# Patient Record
Sex: MALE | Race: BLACK OR AFRICAN AMERICAN | Employment: OTHER
[De-identification: names, ages, dates, MRNs, and addresses within clinical notes are randomized per-mention and may not be internally consistent; named-entity substitution may affect disease eponyms.]

---

## 2024-02-14 ENCOUNTER — HOSPITAL ENCOUNTER (OUTPATIENT)
Facility: HOSPITAL | Age: 29
Setting detail: RECURRING SERIES
End: 2024-02-14
Payer: OTHER GOVERNMENT

## 2024-02-20 ENCOUNTER — HOSPITAL ENCOUNTER (OUTPATIENT)
Facility: HOSPITAL | Age: 29
Setting detail: RECURRING SERIES
Discharge: HOME | End: 2024-02-23
Payer: OTHER GOVERNMENT

## 2024-02-20 PROCEDURE — 97162 PT EVAL MOD COMPLEX 30 MIN: CPT

## 2024-02-20 PROCEDURE — 97535 SELF CARE MNGMENT TRAINING: CPT

## 2024-02-20 PROCEDURE — 97110 THERAPEUTIC EXERCISES: CPT

## 2024-02-20 NOTE — PROGRESS NOTES
PT DAILY TREATMENT NOTE/Hip/Knee/Ankle EVAL 10-18    Patient Name: Elvie Yarbrough    Date: 2024    : 1995  Insurance: Payor: [employer] / Plan: [employer] / Product Type: *No Product type* /      Patient  verified yes     Visit #   Current / Total 1 16   Time   In / Out 2:30 3:14   Pain   In / Out 0 0   Subjective Functional Status/Changes: See below   Changes to:  Meds, Allergies, Med Hx, Sx Hx?  If yes, update Summary List yes       TREATMENT AREA =  Pain in right knee [M25.561]      SUBJECTIVE  Any medication changes, allergies to medications, adverse drug reactions, diagnosis change, or new procedure performed?: [x] No    [] Yes (see summary sheet for update)  Subjective functional status/changes:     Current symptoms/Complaints: right knee pain   Mechanism of Injury: Patient reports initial knee pain that went away after slipping and falling on flexed knee in 2023. Current pain started in 2023 after playing basketball. Pain has been on and off since then, increasing with activity. Does not recall specific injury. Swelled after leg day at gym about 3 weeks ago.     PLOF: functionally independent, active lifestyle playing basketball and weight-lifting, [employer]  Limitations to PLOF: has not been playing basketball or doing leg workouts in last 3 weeks; pain with 1 mile walk daily; pain with prolonged sitting- 15 minutes; keeps knee bent while sleeping due to pain with extension, pain with getting on floor to play with 2 year old son; difficulty with ambulating and stairs when swollen and acutely painful    Pain Level (0-10 scale): pain above and below knee cap; swelling above knee cap  [x]constant []intermittent []improving []worsening []no change since onset  Current: 1-2/10  Best: 0/10 during rarely when first laying down  Worst: 10/10 during work at gym- was unable to walk or bend/flex knee  Sleep: is not  interrupted secondary to pain    Previous Treatment/Compliance: ice,

## 2024-02-20 NOTE — PROGRESS NOTES
[facility]  [address] [place] [address]  Ph [phone]  Fx [phone]    Plan of Care/ Statement of Necessity for Physical Therapy Services      Patient name: Elvie Yarbrough Start of Care: 2024   Referral source: Lisa Moya : 1995    Medical Diagnosis: Pain in right knee [M25.561]   Onset Date:10/1/2023    Treatment Diagnosis: M25.561  RIGHT KNEE PAIN      Prior Hospitalization: see medical history Provider#: [id number]   Medications: Verified on Patient summary List   Comorbidities: None  Prior Level of Function:  functionally independent, active lifestyle playing basketball and weight-lifting, [employer]       The Plan of Care and following information is based on the information from the initial evaluation.  Assessment/ key information:  Patient is a 28 yo male who [facility] with c/o right knee pain. Patient's symptoms began in July with slip and fall on flexed knee, and has been mostly present since playing basketball in October. Swelling began 3 weeks ago after leg day at gym. Patient's impairments include pain, swelling, decreased knee ROM, decreased LE strength. Patient had special tests indicating possible meniscal tear or patellar chondral defect. Patient reports decreased PLOF including inability to workout due to pain and swelling, pain with prolonged walking, pain with sitting >15 minutes, and difficult playing with so on floor. Patient demonstrates decreased ROM, decreased strength, impaired posture, impaired gait mechancis, pain and decreased functional mobility tolerance.     Evaluation Complexity HistoryMEDIUM  Complexity : 1-2 comorbidities / personal factors will impact the outcome/ POC  ; Examination HIGH Complexity : 4+ Standardized tests and measures addressing body structure, function, activity limitation and / or participation in recreation  ;Presentation MEDIUM Complexity : Evolving with changing characteristics

## 2024-02-23 ENCOUNTER — HOSPITAL ENCOUNTER (OUTPATIENT)
Facility: HOSPITAL | Age: 29
Setting detail: RECURRING SERIES
Discharge: HOME | End: 2024-02-26
Payer: OTHER GOVERNMENT

## 2024-02-23 PROCEDURE — 97112 NEUROMUSCULAR REEDUCATION: CPT

## 2024-02-23 PROCEDURE — 97535 SELF CARE MNGMENT TRAINING: CPT

## 2024-02-23 PROCEDURE — 97530 THERAPEUTIC ACTIVITIES: CPT

## 2024-02-23 PROCEDURE — 97110 THERAPEUTIC EXERCISES: CPT

## 2024-02-23 NOTE — PROGRESS NOTES
PHYSICAL / OCCUPATIONAL THERAPY - DAILY TREATMENT NOTE     Patient Name: Elvie Yarbrough    Date: 2024    : 1995  Insurance: Payor: [employer] EAST / Plan: [employer] / Product Type: *No Product type* /      Patient  verified Yes     Visit #   Current / Total 2 16   Time   In / Out 10:32 11:26   Pain   In / Out 2 3   Subjective Functional Status/Changes: Patient has no new complaints.   Changes to:  Allergies, Med Hx, Sx Hx?   no       TREATMENT AREA =  Pain in right knee [M25.561]    OBJECTIVE    Therapeutic Procedures:  Tx Min Billable or 1:1 Min (if diff from Tx Min) Procedure, Rationale, Specifics   13  35834 Therapeutic Exercise (timed):  increase ROM, strength, coordination, balance, and proprioception to improve patient's ability to progress to PLOF and address remaining functional goals. (see flow sheet as applicable)    Details if applicable:       10  08524 Therapeutic Activity (timed):  use of dynamic activities replicating functional movements to increase ROM, strength, coordination, balance, and proprioception in order to improve patient's ability to progress to PLOF and address remaining functional goals.  (see flow sheet as applicable)    Details if applicable:     23  67294 Neuromuscular Re-Education (timed):  improve balance, coordination, kinesthetic sense, posture, core stability and proprioception to improve patient's ability to develop conscious control of individual muscles and awareness of position of extremities in order to progress to PLOF and address remaining functional goals. (see flow sheet as applicable)     Details if applicable:     8  39552 Self Care/Home Management (timed):  improve patient knowledge and understanding of pain reducing techniques, activity modification, diagnosis/prognosis, physical therapy expectations, procedures and progression, and muscle atrophy education  to improve patient's ability to progress to PLOF and address remaining functional goals.  (see

## 2024-02-28 ENCOUNTER — HOSPITAL ENCOUNTER (OUTPATIENT)
Facility: HOSPITAL | Age: 29
Setting detail: RECURRING SERIES
Discharge: HOME | End: 2024-03-02
Payer: OTHER GOVERNMENT

## 2024-02-28 PROCEDURE — 97112 NEUROMUSCULAR REEDUCATION: CPT

## 2024-02-28 PROCEDURE — 97530 THERAPEUTIC ACTIVITIES: CPT

## 2024-02-28 PROCEDURE — 97110 THERAPEUTIC EXERCISES: CPT

## 2024-02-28 NOTE — PROGRESS NOTES
[facility]   4/8/2024  9:50 AM Patience Gillespie, PT [facility]   4/10/2024  9:50 AM Patience Gillespie, PT [facility]

## 2024-03-01 ENCOUNTER — HOSPITAL ENCOUNTER (OUTPATIENT)
Facility: HOSPITAL | Age: 29
Setting detail: RECURRING SERIES
Discharge: HOME | End: 2024-03-04
Payer: OTHER GOVERNMENT

## 2024-03-01 PROCEDURE — 97112 NEUROMUSCULAR REEDUCATION: CPT

## 2024-03-01 PROCEDURE — 97110 THERAPEUTIC EXERCISES: CPT

## 2024-03-01 PROCEDURE — 97530 THERAPEUTIC ACTIVITIES: CPT

## 2024-03-01 NOTE — PROGRESS NOTES
PHYSICAL / OCCUPATIONAL THERAPY - DAILY TREATMENT NOTE     Patient Name: lEvie Yarbrough    Date: 3/1/2024    : 1995  Insurance: Payor:  EAST / Plan:  EAST / Product Type: *No Product type* /      Patient  verified Yes     Visit #   Current / Total 4 16   Time   In / Out 1225 105   Pain   In / Out 3/10 3/10   Subjective Functional Status/Changes: Pt reported that he is still having catching during transfers   Changes to:  Allergies, Med Hx, Sx Hx?   no       TREATMENT AREA =  Pain in right knee [M25.561]    OBJECTIVE    Therapeutic Procedures:  Tx Min Billable or 1:1 Min (if diff from Tx Min) Procedure, Rationale, Specifics   15 15 05754 Therapeutic Exercise (timed):  increase ROM, strength, coordination, balance, and proprioception to improve patient's ability to progress to PLOF and address remaining functional goals. (see flow sheet as applicable)    Details if applicable:       15 15 16794 Neuromuscular Re-Education (timed):  improve balance, coordination, kinesthetic sense, posture, core stability and proprioception to improve patient's ability to develop conscious control of individual muscles and awareness of position of extremities in order to progress to PLOF and address remaining functional goals. (see flow sheet as applicable)    Details if applicable:     10 10 35157 Therapeutic Activity (timed):  use of dynamic activities replicating functional movements to increase ROM, strength, coordination, balance, and proprioception in order to improve patient's ability to progress to PLOF and address remaining functional goals.  (see flow sheet as applicable)     Details if applicable:     40 40 [facility] Totals Reminder: bill using total billable min of TIMED therapeutic procedures (example: do not include dry needle or estim unattended, both untimed codes, in totals to left)  8-22 min = 1 unit; 23-37 min = 2 units; 38-52 min = 3 units; 53-67 min = 4 units; 68-82 min = 5 units   Total Total

## 2024-03-04 ENCOUNTER — HOSPITAL ENCOUNTER (OUTPATIENT)
Facility: HOSPITAL | Age: 29
Setting detail: RECURRING SERIES
Discharge: HOME | End: 2024-03-07
Payer: OTHER GOVERNMENT

## 2024-03-04 PROCEDURE — 97530 THERAPEUTIC ACTIVITIES: CPT

## 2024-03-04 PROCEDURE — 97112 NEUROMUSCULAR REEDUCATION: CPT

## 2024-03-04 PROCEDURE — 97110 THERAPEUTIC EXERCISES: CPT

## 2024-03-04 NOTE — PROGRESS NOTES
PHYSICAL / OCCUPATIONAL THERAPY - DAILY TREATMENT NOTE     Patient Name: Elvie Yarbrough    Date: 3/4/2024    : 1995  Insurance: Payor:  EAST / Plan:  EAST / Product Type: *No Product type* /      Patient  verified Yes     Visit #   Current / Total 5 16   Time   In / Out 12:30 1:11   Pain   In / Out 1 4   Subjective Functional Status/Changes: Patient reports decreased knee pain today   Changes to:  Allergies, Med Hx, Sx Hx?   no       TREATMENT AREA =  Pain in right knee [M25.561]    OBJECTIVE      Therapeutic Procedures:  Tx Min Billable or 1:1 Min (if diff from Tx Min) Procedure, Rationale, Specifics   15  59917 Therapeutic Exercise (timed):  increase ROM, strength, coordination, balance, and proprioception to improve patient's ability to progress to PLOF and address remaining functional goals. (see flow sheet as applicable)    Details if applicable:       15  54956 Neuromuscular Re-Education (timed):  improve balance, coordination, kinesthetic sense, posture, core stability and proprioception to improve patient's ability to develop conscious control of individual muscles and awareness of position of extremities in order to progress to PLOF and address remaining functional goals. (see flow sheet as applicable)    Details if applicable:     11  44609 Therapeutic Activity (timed):  use of dynamic activities replicating functional movements to increase ROM, strength, coordination, balance, and proprioception in order to improve patient's ability to progress to PLOF and address remaining functional goals.  (see flow sheet as applicable)     Details if applicable:           Details if applicable:            Details if applicable:     41  [facility] Totals Reminder: bill using total billable min of TIMED therapeutic procedures (example: do not include dry needle or estim unattended, both untimed codes, in totals to left)  8-22 min = 1 unit; 23-37 min = 2 units; 38-52 min = 3 units; 53-67 min = 4 units;

## 2024-03-05 ENCOUNTER — HOSPITAL ENCOUNTER (OUTPATIENT)
Facility: HOSPITAL | Age: 29
Setting detail: RECURRING SERIES
End: 2024-03-05
Payer: OTHER GOVERNMENT

## 2024-03-05 ENCOUNTER — TELEPHONE (OUTPATIENT)
Facility: HOSPITAL | Age: 29
End: 2024-03-05

## 2024-03-05 NOTE — PROGRESS NOTES
PHYSICAL / OCCUPATIONAL THERAPY - DAILY TREATMENT NOTE     Patient Name: Elvie Yarbrough    Date: 3/5/2024    : 1995  Insurance: Payor: [employer] / Plan: [employer] / Product Type: *No Product type* /      Patient  verified Yes     Visit #   Current / Total 6 16   Time   In / Out *** ***   Pain   In / Out *** ***   Subjective Functional Status/Changes: ***   Changes to:  Allergies, Med Hx, Sx Hx?   no       TREATMENT AREA =  Pain in right knee [M25.561]    OBJECTIVE    Modalities Rationale:     decrease edema, decrease inflammation, and decrease pain to improve patient's ability to progress to PLOF and address remaining functional goals.     min [] Estim Unattended, type/location:                                      []  w/ice    []  w/heat    min [] Estim Attended, type/location:                                     []  w/US     []  w/ice    []  w/heat    []  TENS insruct      min []  Mechanical Traction: type/lbs                   []  pro   []  sup   []  int   []  cont    []  before manual    []  after manual    min []  Ultrasound, settings/location:      min []  Iontophoresis w/ dexamethasone, location:                                               []  take home patch       []  in clinic        min  unbilled []  Ice     []  Heat    location/position:     min []  Paraffin,  details:     min []  Vasopneumatic Device, press/temp:     min []  Whirlpool / Fluido:    If using vaso (only need to measure limb vaso being performed on)      pre-treatment girth :       post-treatment girth :       measured at (landmark location) :      min []  Other:    Skin assessment post-treatment (if applicable):    [x]  intact    []  redness- no adverse reaction                 []redness - adverse reaction:         Therapeutic Procedures:  Tx Min Billable or 1:1 Min (if diff from Tx Min) Procedure, Rationale, Specifics   ***  65251 Therapeutic Exercise (timed):  increase ROM, strength, coordination, balance, and

## 2024-03-12 ENCOUNTER — HOSPITAL ENCOUNTER (OUTPATIENT)
Facility: HOSPITAL | Age: 29
Setting detail: RECURRING SERIES
Discharge: HOME | End: 2024-03-15
Payer: OTHER GOVERNMENT

## 2024-03-12 PROCEDURE — 97110 THERAPEUTIC EXERCISES: CPT

## 2024-03-12 PROCEDURE — 97535 SELF CARE MNGMENT TRAINING: CPT

## 2024-03-12 PROCEDURE — 97530 THERAPEUTIC ACTIVITIES: CPT

## 2024-03-12 PROCEDURE — 97112 NEUROMUSCULAR REEDUCATION: CPT

## 2024-03-12 NOTE — PROGRESS NOTES
to perform 10 reps of squats with at least 30# weight with good mechanics and no pain for ability to play and care for son.              Eval status: pain reported with squats   Current: used TRX to perform squats to work on proper posterior weight shifting to avoid bilateral knee pain    3/12/24, IN PROGRESS    PLAN  Yes  Continue plan of care  []  Upgrade activities as tolerated  []  Discharge due to :  []  Other:    Patience Gillespie, LENNY    3/12/2024    9:41 AM    Future Appointments   Date Time Provider Department Center   3/15/2024  1:10 PM Antonella Carrillo, PT [facility]   3/18/2024  9:10 AM [facility]   3/20/2024  9:50 AM Patience [facility]illespie, [facility]   3/25/2024  9:50 AM Patience Gillespie, [facility]   3/27/2024  9:50 AM Camila Viera, PT [facility]   4/1/2024  9:50 AM Patience Gillespie, PT [facility]   4/3/2024  9:10 AM Patience Gillespie, PT [facility]   4/8/2024  9:50 AM Patience Gillespie, [facility]   4/10/2024  9:50 AM [facility]illespie[facility]

## 2024-03-15 ENCOUNTER — HOSPITAL ENCOUNTER (OUTPATIENT)
Facility: HOSPITAL | Age: 29
Setting detail: RECURRING SERIES
End: 2024-03-15
Payer: OTHER GOVERNMENT

## 2024-03-15 ENCOUNTER — TELEPHONE (OUTPATIENT)
Facility: HOSPITAL | Age: 29
End: 2024-03-15

## 2024-03-18 ENCOUNTER — HOSPITAL ENCOUNTER (OUTPATIENT)
Facility: HOSPITAL | Age: 29
Setting detail: RECURRING SERIES
Discharge: HOME | End: 2024-03-21
Payer: OTHER GOVERNMENT

## 2024-03-18 PROCEDURE — 97112 NEUROMUSCULAR REEDUCATION: CPT

## 2024-03-18 PROCEDURE — 97110 THERAPEUTIC EXERCISES: CPT

## 2024-03-18 PROCEDURE — 97530 THERAPEUTIC ACTIVITIES: CPT

## 2024-03-18 NOTE — PROGRESS NOTES
PHYSICAL / OCCUPATIONAL THERAPY - DAILY TREATMENT NOTE     Patient Name: Elvie Yarbrough    Date: 3/18/2024    : 1995  Insurance: Payor:  EAST / Plan: [facility] / Product Type: *No Product type* /      Patient  verified Yes     Visit #   Current / Total 7 16   Time   In / Out 1:56 2:40   Pain   In / Out 4 3   Subjective Functional Status/Changes: Patient reports no new symptoms.   Changes to:  Allergies, Med Hx, Sx Hx?   no       TREATMENT AREA =  Pain in right knee [M25.561]    OBJECTIVE      Therapeutic Procedures:  Tx Min Billable or 1:1 Min (if diff from Tx Min) Procedure, Rationale, Specifics   8  43335 Therapeutic Exercise (timed):  increase ROM, strength, coordination, balance, and proprioception to improve patient's ability to progress to PLOF and address remaining functional goals. (see flow sheet as applicable)    Details if applicable:       12  04778 Therapeutic Activity (timed):  use of dynamic activities replicating functional movements to increase ROM, strength, coordination, balance, and proprioception in order to improve patient's ability to progress to PLOF and address remaining functional goals.  (see flow sheet as applicable)    Details if applicable:     24  91101 Neuromuscular Re-Education (timed):  improve balance, coordination, kinesthetic sense, posture, core stability and proprioception to improve patient's ability to develop conscious control of individual muscles and awareness of position of extremities in order to progress to PLOF and address remaining functional goals. (see flow sheet as applicable)     Details if applicable:     44  [facility] Totals Reminder: bill using total billable min of TIMED therapeutic procedures (example: do not include dry needle or estim unattended, both untimed codes, in totals to left)  8-22 min = 1 unit; 23-37 min = 2 units; 38-52 min = 3 units; 53-67 min = 4 units; 68-82 min = 5 units   Total Total     TOTAL TREATMENT TIME:        44     [x]

## 2024-03-20 ENCOUNTER — HOSPITAL ENCOUNTER (OUTPATIENT)
Facility: HOSPITAL | Age: 29
Setting detail: RECURRING SERIES
Discharge: HOME | End: 2024-03-23
Payer: OTHER GOVERNMENT

## 2024-03-20 PROCEDURE — 97535 SELF CARE MNGMENT TRAINING: CPT

## 2024-03-20 PROCEDURE — 97112 NEUROMUSCULAR REEDUCATION: CPT

## 2024-03-20 PROCEDURE — 97110 THERAPEUTIC EXERCISES: CPT

## 2024-03-20 PROCEDURE — 97530 THERAPEUTIC ACTIVITIES: CPT

## 2024-03-20 NOTE — PROGRESS NOTES
PHYSICAL / OCCUPATIONAL THERAPY - DAILY TREATMENT NOTE     Patient Name: Elvie Yarbrough    Date: 3/20/2024    : 1995  Insurance: Payor:  EAST / Plan:  EAST / Product Type: *No Product type* /      Patient  verified Yes     Visit #   Current / Total 8 16   Time   In / Out 0950 1035   Pain   In / Out 4 3   Subjective Functional Status/Changes: Pt pleasant, reports he has been going to the gym but has not been doing any leg exercises or strenghening bc he was told not to. This writer encouraged him to gradually return to incorporating LE exercises back into workout routine with low weight, high repetitions.   Changes to:  Allergies, Med Hx, Sx Hx?   no       TREATMENT AREA =  Pain in right knee [M25.561]    OBJECTIVE    Therapeutic Procedures:  Tx Min Billable or 1:1 Min (if diff from Tx Min) Procedure, Rationale, Specifics   12  65976 Therapeutic Exercise (timed):  increase ROM, strength, coordination, balance, and proprioception to improve patient's ability to progress to PLOF and address remaining functional goals. (see flow sheet as applicable)    Details if applicable:       13  60906 Neuromuscular Re-Education (timed):  improve balance, coordination, kinesthetic sense, posture, core stability and proprioception to improve patient's ability to develop conscious control of individual muscles and awareness of position of extremities in order to progress to PLOF and address remaining functional goals. (see flow sheet as applicable)    Details if applicable:     10  28788 Therapeutic Activity (timed):  use of dynamic activities replicating functional movements to increase ROM, strength, coordination, balance, and proprioception in order to improve patient's ability to progress to PLOF and address remaining functional goals.  (see flow sheet as applicable)     Details if applicable:     10  93084 Self Care/Home Management (timed):  improve patient knowledge and understanding of positioning,  Pt received the Stamaril yellow fever vaccination. Pt tolerated the injection well. Yellow travel card provided. Pt left the unit in NAD.

## 2024-03-25 ENCOUNTER — HOSPITAL ENCOUNTER (OUTPATIENT)
Facility: HOSPITAL | Age: 29
Setting detail: RECURRING SERIES
Discharge: HOME | End: 2024-03-28
Payer: OTHER GOVERNMENT

## 2024-03-25 PROCEDURE — 97530 THERAPEUTIC ACTIVITIES: CPT

## 2024-03-25 PROCEDURE — 97112 NEUROMUSCULAR REEDUCATION: CPT

## 2024-03-25 PROCEDURE — 97110 THERAPEUTIC EXERCISES: CPT

## 2024-03-25 NOTE — PROGRESS NOTES
PHYSICAL / OCCUPATIONAL THERAPY - DAILY TREATMENT NOTE     Patient Name: Elvie Yarbrough    Date: 3/25/2024    : 1995  Insurance: Payor:  EAST / Plan:  EAST / Product Type: *No Product type* /      Patient  verified Yes     Visit #   Current / Total 9 16   Time   In / Out 0956 1035   Pain   In / Out 3 3   Subjective Functional Status/Changes: Pt pleasant, reports he went to the gym and did some leg exercises without weight. He reports no increased pain.   Changes to:  Allergies, Med Hx, Sx Hx?   no       TREATMENT AREA =  Pain in right knee [M25.561]    OBJECTIVE      Therapeutic Procedures:  Tx Min Billable or 1:1 Min (if diff from Tx Min) Procedure, Rationale, Specifics   9  70036 Therapeutic Exercise (timed):  increase ROM, strength, coordination, balance, and proprioception to improve patient's ability to progress to PLOF and address remaining functional goals. (see flow sheet as applicable)    Details if applicable:       20  22479 Neuromuscular Re-Education (timed):  improve balance, coordination, kinesthetic sense, posture, core stability and proprioception to improve patient's ability to develop conscious control of individual muscles and awareness of position of extremities in order to progress to PLOF and address remaining functional goals. (see flow sheet as applicable)    Details if applicable:     10  90029 Therapeutic Activity (timed):  use of dynamic activities replicating functional movements to increase ROM, strength, coordination, balance, and proprioception in order to improve patient's ability to progress to PLOF and address remaining functional goals.  (see flow sheet as applicable)     Details if applicable:           Details if applicable:            Details if applicable:     39  [facility] Totals Reminder: bill using total billable min of TIMED therapeutic procedures (example: do not include dry needle or estim unattended, both untimed codes, in totals to left)  8-22 min = 1

## 2024-03-25 NOTE — PROGRESS NOTES
[facility]  [address] [place] [address]  Ph [phone]  Fx [phone]    Physical Therapy Progress Note  Patient name: Elvie Yarbrough Start of Care: 2024   Referral source: Lisa Moya : 1995               Medical Diagnosis: Pain in right knee [M25.561]    Onset Date:10/1/2023               Treatment Diagnosis: M25.561  RIGHT KNEE PAIN      Prior Hospitalization: see medical history Provider#: [id number]   Medications: Verified on Patient summary List   Comorbidities: None  Prior Level of Function:  functionally independent, active lifestyle playing basketball and weight-lifting, [employer]     Visits from Start of Care: 9    Missed Visits: 3    Updated Goals/Measure of Progress: To be achieved in 6 treatments:    Short Term Goals: To be accomplished in 8 treatments}:  Patient will be independent and compliant with HEP to progress toward goals and restore functional mobility.   Eval Status: issued at eval  Current: Patient reports compliance with his HEP.    3/12/24, MET     Patient will report ability to sit for 60 minutes without knee pain.   Eval Status: knee pain after sitting 15 minutes.  3/1/24: able to sit for apx 20-25 min with out pain   Current: 4-5/10 pain when sitting for 30 minutes, patient hasn't yet tried sitting for an hour   3/12/24, MET     3.   Pt will demonstrate normalized, symmetrical gait for ability to ambulate 1 mile without knee pain.  Eval Status: Gait: favors left LE, decreased stance time on right and decreased weight-shift right  3/25/24 PN: normal gait mechanics; pt reports he walked for 40 minutes the other with with 4/10 pain at worst during walk progressing     Pt will have painfree right knee AROM 0 to 132 to aid in functional mechanics for ambulation/ADLs.  Eval Status: AROM 2 to 125 limited by pain  Current: AROM: 0-135 degs   3/12/24, MET     Long Term Goals: To be accomplished in 16 treatments:  Patient will improve FOTO

## 2024-03-27 ENCOUNTER — HOSPITAL ENCOUNTER (OUTPATIENT)
Facility: HOSPITAL | Age: 29
Setting detail: RECURRING SERIES
Discharge: HOME | End: 2024-03-30
Payer: OTHER GOVERNMENT

## 2024-03-27 PROCEDURE — 97530 THERAPEUTIC ACTIVITIES: CPT

## 2024-03-27 PROCEDURE — 97110 THERAPEUTIC EXERCISES: CPT

## 2024-03-27 PROCEDURE — 97112 NEUROMUSCULAR REEDUCATION: CPT

## 2024-03-27 NOTE — PROGRESS NOTES
PHYSICAL / OCCUPATIONAL THERAPY - DAILY TREATMENT NOTE     Patient Name: Elvie Yarbrough    Date: 3/27/2024    : 1995  Insurance: Payor:  EAST / Plan:  EAST / Product Type: *No Product type* /      Patient  verified Yes     Visit #   Current / Total 10 16   Time   In / Out 955 1029   Pain   In / Out 3/10 5/10   Subjective Functional Status/Changes: I feel like I am aging too fast; I'm the youngest one here    Changes to:  Allergies, Med Hx, Sx Hx?   no       TREATMENT AREA =  Pain in right knee [M25.561]    OBJECTIVE    Therapeutic Procedures:  Tx Min Billable or 1:1 Min (if diff from Tx Min) Procedure, Rationale, Specifics   10 10 59395 Therapeutic Exercise (timed):  increase ROM, strength, coordination, balance, and proprioception to improve patient's ability to progress to PLOF and address remaining functional goals. (see flow sheet as applicable)    Details if applicable:       10 10 35880 Neuromuscular Re-Education (timed):  improve balance, coordination, kinesthetic sense, posture, core stability and proprioception to improve patient's ability to develop conscious control of individual muscles and awareness of position of extremities in order to progress to PLOF and address remaining functional goals. (see flow sheet as applicable)    Details if applicable:      13767 Therapeutic Activity (timed):  use of dynamic activities replicating functional movements to increase ROM, strength, coordination, balance, and proprioception in order to improve patient's ability to progress to PLOF and address remaining functional goals.  (see flow sheet as applicable)    Details if applicable:     34 34 [facility] Totals Reminder: bill using total billable min of TIMED therapeutic procedures (example: do not include dry needle or estim unattended, both untimed codes, in totals to left)  8-22 min = 1 unit; 23-37 min = 2 units; 38-52 min = 3 units; 53-67 min = 4 units; 68-82 min = 5 units   Total Total

## 2024-04-01 ENCOUNTER — HOSPITAL ENCOUNTER (OUTPATIENT)
Facility: HOSPITAL | Age: 29
Setting detail: RECURRING SERIES
End: 2024-04-01
Payer: OTHER GOVERNMENT

## 2024-04-03 ENCOUNTER — APPOINTMENT (OUTPATIENT)
Facility: HOSPITAL | Age: 29
End: 2024-04-03
Payer: OTHER GOVERNMENT

## 2024-04-08 ENCOUNTER — HOSPITAL ENCOUNTER (OUTPATIENT)
Facility: HOSPITAL | Age: 29
Setting detail: RECURRING SERIES
End: 2024-04-08
Payer: OTHER GOVERNMENT

## 2024-04-08 ENCOUNTER — TELEPHONE (OUTPATIENT)
Facility: HOSPITAL | Age: 29
End: 2024-04-08

## 2024-04-09 ENCOUNTER — HOSPITAL ENCOUNTER (OUTPATIENT)
Facility: HOSPITAL | Age: 29
Setting detail: RECURRING SERIES
Discharge: HOME | End: 2024-04-12
Payer: OTHER GOVERNMENT

## 2024-04-09 PROCEDURE — 97110 THERAPEUTIC EXERCISES: CPT

## 2024-04-09 PROCEDURE — 97530 THERAPEUTIC ACTIVITIES: CPT

## 2024-04-09 PROCEDURE — 97112 NEUROMUSCULAR REEDUCATION: CPT

## 2024-04-09 NOTE — PROGRESS NOTES
PHYSICAL / OCCUPATIONAL THERAPY - DAILY TREATMENT NOTE     Patient Name: Elvie Yarbrough    Date: 2024    : 1995  Insurance: Payor:  EAST / Plan: [email or web address] EAST / Product Type: *No Product type* /      Patient  verified Yes     Visit #   Current / Total 11 16   Time   In / Out 2:56 PM 3:34 PM   Pain   In / Out 1 2   Subjective Functional Status/Changes: Patient reports he is doing well today. No new changes to report at this time.    Changes to:  Allergies, Med Hx, Sx Hx?   no       TREATMENT AREA =  Pain in right knee [M25.561]    OBJECTIVE      Therapeutic Procedures:  Tx Min Billable or 1:1 Min (if diff from Tx Min) Procedure, Rationale, Specifics   16  22101 Therapeutic Exercise (timed):  increase ROM, strength, coordination, balance, and proprioception to improve patient's ability to progress to PLOF and address remaining functional goals. (see flow sheet as applicable)    Details if applicable:       12  61381 Therapeutic Activity (timed):  use of dynamic activities replicating functional movements to increase ROM, strength, coordination, balance, and proprioception in order to improve patient's ability to progress to PLOF and address remaining functional goals.  (see flow sheet as applicable)    Details if applicable:     10  67332 Neuromuscular Re-Education (timed):  improve balance, coordination, kinesthetic sense, posture, core stability and proprioception to improve patient's ability to develop conscious control of individual muscles and awareness of position of extremities in order to progress to PLOF and address remaining functional goals. (see flow sheet as applicable)     Details if applicable:           Details if applicable:            Details if applicable:     38  [facility] Totals Reminder: bill using total billable min of TIMED therapeutic procedures (example: do not include dry needle or estim unattended, both untimed codes, in totals to left)  8-22 min = 1 unit; 23-37 min = 2 units;

## 2024-04-10 ENCOUNTER — HOSPITAL ENCOUNTER (OUTPATIENT)
Facility: HOSPITAL | Age: 29
Setting detail: RECURRING SERIES
Discharge: HOME | End: 2024-04-13
Payer: OTHER GOVERNMENT

## 2024-04-10 PROCEDURE — 97112 NEUROMUSCULAR REEDUCATION: CPT

## 2024-04-10 PROCEDURE — 97530 THERAPEUTIC ACTIVITIES: CPT

## 2024-04-10 NOTE — PROGRESS NOTES
walk.  Current: 0-2/10 pain at worst in the last week    4/10/24, MET                 5. Patient will demonstrate ability to perform 10 reps of squats with at least 30# weight with good mechanics and no pain for ability to play and care for son.              Eval status: pain reported with squats              3/25/24 PN:  used TRX to perform squats to work on proper posterior weight shifting to avoid bilateral knee pain    IN PROGRESS   Current: Patient able to perform 2x10 reps of 15# Goblet squats with good form.   410/24, PROGRESSING     PLAN  Yes  Continue plan of care  []  Upgrade activities as tolerated  []  Discharge due to :  []  Other:    Patience Gillespie, PT    4/10/2024    9:54 AM    Future Appointments   Date Time Provider Department Center   4/15/2024  1:50 PM Antonella Carrillo, PT [facility]   4/19/2024  2:30 PM Trevon Howe, MT [facility]   4/26/2024  2:30 PM Camila Viera, PT [facility]

## 2024-04-15 ENCOUNTER — HOSPITAL ENCOUNTER (OUTPATIENT)
Facility: HOSPITAL | Age: 29
Setting detail: RECURRING SERIES
Discharge: HOME | End: 2024-04-18
Payer: OTHER GOVERNMENT

## 2024-04-15 PROCEDURE — 97530 THERAPEUTIC ACTIVITIES: CPT

## 2024-04-15 PROCEDURE — 97110 THERAPEUTIC EXERCISES: CPT

## 2024-04-15 PROCEDURE — 97112 NEUROMUSCULAR REEDUCATION: CPT

## 2024-04-15 NOTE — PROGRESS NOTES
38-52 min = 3 units; 53-67 min = 4 units; 68-82 min = 5 units   Total Total     TOTAL TREATMENT TIME:        37     [x]  Patient Education billed concurrently with other procedures   [x] Review HEP    [] Progressed/Changed HEP, detail:    [] Other detail:       Objective Information/Functional Measures/Assessment    Patient tolerated treatment session well today. Patient had no complaints with increased weight for goblet squats and single leg press to accomplish lower extremity strength and stability. Patient continues to make steady progress toward goals and would benefit from continued skilled PT intervention to address remaining deficits outlined in goals below.    Patient will continue to benefit from skilled PT / OT services to modify and progress therapeutic interventions, analyze and address functional mobility deficits, analyze and address ROM deficits, analyze and address strength deficits, analyze and address soft tissue restrictions, analyze and cue for proper movement patterns, and analyze and modify for postural abnormalities to address functional deficits and attain remaining goals.    Progress toward goals / Updated goals:  []  See Progress Note/Recertification    Short Term Goals: To be accomplished in 8 treatments}:  Patient will be independent and compliant with HEP to progress toward goals and restore functional mobility.   Eval Status: issued at [facility]  Current: Patient reports compliance with his HEP.    3/12/24, MET     Patient will report ability to sit for 60 minutes without knee pain.   Eval Status: knee pain after sitting 15 minutes.  3/27/24: pt reported that he is able to sit but does not have pain but stiffness GOAL MET     3.   Pt will demonstrate normalized, symmetrical gait for ability to ambulate 1 mile without knee pain.  Eval Status: Gait: favors left LE, decreased stance time on right and decreased weight-shift right  3/25/24 PN: normal gait mechanics; pt reports he walked for 40

## 2024-04-19 ENCOUNTER — TELEPHONE (OUTPATIENT)
Facility: HOSPITAL | Age: 29
End: 2024-04-19

## 2024-04-19 ENCOUNTER — HOSPITAL ENCOUNTER (OUTPATIENT)
Facility: HOSPITAL | Age: 29
Setting detail: RECURRING SERIES
End: 2024-04-19
Payer: OTHER GOVERNMENT

## 2024-04-22 ENCOUNTER — HOSPITAL ENCOUNTER (OUTPATIENT)
Facility: HOSPITAL | Age: 29
Setting detail: RECURRING SERIES
Discharge: HOME | End: 2024-04-25
Payer: OTHER GOVERNMENT

## 2024-04-22 PROCEDURE — 97112 NEUROMUSCULAR REEDUCATION: CPT

## 2024-04-22 PROCEDURE — 97530 THERAPEUTIC ACTIVITIES: CPT

## 2024-04-22 PROCEDURE — 97110 THERAPEUTIC EXERCISES: CPT

## 2024-04-22 NOTE — PROGRESS NOTES
PHYSICAL / OCCUPATIONAL THERAPY - DAILY TREATMENT NOTE     Patient Name: Elvie Yarbrough    Date: 2024    : 1995  Insurance: Payor:  EAST / Plan:  EAST / Product Type: *No Product type* /      Patient  verified Yes     Visit #   Current / Total 14 16   Time   In / Out 0845 [id number]   Pain   In / Out 0 0   Subjective Functional Status/Changes: Pt pleasant, reports no new changes. He reports he was able to go to the gym and do a leg workout without any increased pain   Changes to:  Allergies, Med Hx, Sx Hx?   no       TREATMENT AREA =  Pain in right knee [M25.561]    OBJECTIVE    Therapeutic Procedures:  Tx Min Billable or 1:1 Min (if diff from Tx Min) Procedure, Rationale, Specifics   10  30386 Therapeutic Exercise (timed):  increase ROM, strength, coordination, balance, and proprioception to improve patient's ability to progress to PLOF and address remaining functional goals. (see flow sheet as applicable)    Details if applicable:       20  11209 Neuromuscular Re-Education (timed):  improve balance, coordination, kinesthetic sense, posture, core stability and proprioception to improve patient's ability to develop conscious control of individual muscles and awareness of position of extremities in order to progress to PLOF and address remaining functional goals. (see flow sheet as applicable)    Details if applicable:     10  92691 Therapeutic Activity (timed):  use of dynamic activities replicating functional movements to increase ROM, strength, coordination, balance, and proprioception in order to improve patient's ability to progress to PLOF and address remaining functional goals.  (see flow sheet as applicable)     Details if applicable:           Details if applicable:            Details if applicable:     40  [facility] Totals Reminder: bill using total billable min of TIMED therapeutic procedures (example: do not include dry needle or estim unattended, both untimed codes, in totals to

## 2024-04-26 ENCOUNTER — TELEPHONE (OUTPATIENT)
Facility: HOSPITAL | Age: 29
End: 2024-04-26

## 2024-04-26 NOTE — TELEPHONE ENCOUNTER
Pt no show appointment. PTA called and left voicemail informing pt of lack of appointments scheduled at this time. Pt informed of possible D/C if pt did not call back within the next several weeks.

## 2024-05-14 ENCOUNTER — TELEPHONE (OUTPATIENT)
Facility: HOSPITAL | Age: 29
End: 2024-05-14

## 2024-05-16 ENCOUNTER — TELEPHONE (OUTPATIENT)
Facility: HOSPITAL | Age: 29
End: 2024-05-16

## 2024-12-19 ENCOUNTER — HOSPITAL ENCOUNTER (OUTPATIENT)
Facility: HOSPITAL | Age: 29
Setting detail: RECURRING SERIES
Discharge: HOME | End: 2024-12-22
Payer: OTHER GOVERNMENT

## 2024-12-19 PROCEDURE — 97161 PT EVAL LOW COMPLEX 20 MIN: CPT

## 2024-12-19 PROCEDURE — 97535 SELF CARE MNGMENT TRAINING: CPT

## 2024-12-19 PROCEDURE — 97530 THERAPEUTIC ACTIVITIES: CPT

## 2024-12-19 NOTE — PROGRESS NOTES
[facility] at [facility]  [address] [place] [address]  Ph [phone]  Fx [phone]    Plan of Care/ Statement of Necessity for Physical Therapy Services      Patient name: Elvie Yarbrough Start of Care: 2024   Referral source: Fabiano Reyez* : 1995    Medical Diagnosis: Low back pain [M54.50]   Onset Date:24    Treatment Diagnosis: M54.59  OTHER LOWER BACK PAIN     Prior Hospitalization: see medical history Provider#: [id number]     Comorbidities: chronic LBP, chronic right knee pain, OA  Substance Use: [] tobacco use, [] alcohol use, [] other:   Patients Self-Rated Overall Health Status: [] poor, [] fair, [x] good, [] excellent  Number of Falls Within the Past Year: [x] none, []   Prior Level of Function:   [x] Unrestricted with functional activities and ADL's  [x] No assistive device  [x] active lifestyle, [] moderately active lifestyle, [] sedentary lifestyle  Patients Goals:  \"To improve the discomfort and pain and learn some stretches/exercises\"      The Plan of Care and following information is based on the information from the initial evaluation.  Assessment/ key information: Patient is a pleasant 29 y.o. male presenting to skilled PT c/o LBP that started in 2024, which patient relates to lifting weights.  Patient states for the first 2 weeks he was really limited in mobility and functional movements secondary to pain, but patient states the pain then dissipated.  However, he does continue to have increased pain and/or difficulty when going from supine to sit, prolonged sitting > 30 minutes, prolonged walking > one mile, as well as pulling and lifting heavy objects.  Patient reports originally he was having \"sharp pain\" done his right LE, albeit this stopped about a month ago and has not returned.  Patient denies any known LE weakness, but does report a prior history of right knee pain.  Patient works a [employer] for the [employer].  Patient's usual

## 2024-12-19 NOTE — PROGRESS NOTES
PHYSICAL THERAPY EVALUATION / DAILY TREATMENT      Patient Name: Elvie Yarbrough    Date: 2024    : 1995  Insurance: Payor:  EAST / Plan:  EAST PRIME / Product Type: *No Product type* /      Patient  verified yes     Visit #   Current / Total 1 15   Time   In / Out 12:30 1:01   Pain   In / Out 3 3     TREATMENT AREA =  Low back pain [M54.50]    If an interpreting service is utilized for treatment of this patient, the contents of this document represent the material reviewed with the patient via the .     SUBJECTIVE:  Chief Complaint/Mechanism of Injury:   Patient is a pleasant 29 y.o. male with c/o LBP that started in 2024, which patient relates to lifting weights.  Patient states for the first 2 weeks he was really limited in mobility and functional movements secondary to pain, but patient states the pain then dissipated.  However, he does continue to have increased pain and/or difficulty when going from supine to sit, prolonged sitting > 30 minutes, prolonged walking > one mile, as well as pulling and lifting heavy objects.  Patient reports originally he was having \"sharp pain\" done his right LE, albeit this stopped about a month ago and has not returned.  Patient denies any known LE weakness, but does report a prior history of right knee pain.  Patient works a [employer] for the [employer].  Patient's usual hobbies include going to the gym, playing sports, and playing with his son.  Patient states he goes the gym 4 days a week, though he does report increased pain when picking up dumbbells.     Pain Level (out of 0-10 scale): 8/10 pain at worst, but an average daily pain of 3/10  [x] constant, [] intermittent, [x] improving, [] worsening, [] no change since onset  Alleviating Factors: heat, laying flat on his back  Previous Treatment for Current Injury: none  Diagnostic Imaging for Current Injury: none  Living Situation: lives with family in a 2 story

## 2024-12-23 ENCOUNTER — HOSPITAL ENCOUNTER (OUTPATIENT)
Facility: HOSPITAL | Age: 29
Setting detail: RECURRING SERIES
Discharge: HOME | End: 2024-12-26
Payer: OTHER GOVERNMENT

## 2024-12-23 PROCEDURE — 97530 THERAPEUTIC ACTIVITIES: CPT

## 2024-12-23 PROCEDURE — 97535 SELF CARE MNGMENT TRAINING: CPT

## 2024-12-23 PROCEDURE — 97112 NEUROMUSCULAR REEDUCATION: CPT

## 2024-12-23 PROCEDURE — 97110 THERAPEUTIC EXERCISES: CPT

## 2024-12-23 NOTE — PROGRESS NOTES
PHYSICAL / OCCUPATIONAL THERAPY - DAILY TREATMENT NOTE     Patient Name: Elvie Yarbrough    Date: 2024    : 1995  Insurance: Payor:  EAST / Plan: [employer] EAST PRIME / Product Type: *No Product type* /      Patient  verified Yes     Visit #   Current / Total 2 15   Time   In / Out 8:30 9:09   Pain   In / Out 5 5   Subjective Functional Status/Changes: Patient reports \"tweaked\" back 2 days ago standing up from low chair holding baby   Changes to:  Allergies, Med Hx, Sx Hx?   no       TREATMENT AREA =  Low back pain [M54.50]    If an interpreting service is utilized for treatment of this patient, the contents of this document represent the material reviewed with the patient via the .     OBJECTIVE      Therapeutic Procedures:  Tx Min Billable or 1:1 Min (if diff from Tx Min) Procedure, Rationale, Specifics   11  10516 Therapeutic Exercise (timed):  increase ROM, strength, coordination, balance, and proprioception to improve patient's ability to progress to PLOF and address remaining functional goals. (see flow sheet as applicable)    Details if applicable:       10  23737 Neuromuscular Re-Education (timed):  improve balance, coordination, kinesthetic sense, posture, core stability and proprioception to improve patient's ability to develop conscious control of individual muscles and awareness of position of extremities in order to progress to PLOF and address remaining functional goals. (see flow sheet as applicable)    Details if applicable:     10  27862 Therapeutic Activity (timed):  use of dynamic activities replicating functional movements to increase ROM, strength, coordination, balance, and proprioception in order to improve patient's ability to progress to PLOF and address remaining functional goals.  (see flow sheet as applicable)     Details if applicable:     8  99544 Self Care/Home Management (timed):  improve patient knowledge and understanding of home injury/symptom/pain

## 2025-01-02 ENCOUNTER — HOSPITAL ENCOUNTER (OUTPATIENT)
Facility: HOSPITAL | Age: 30
Setting detail: RECURRING SERIES
Discharge: HOME | End: 2025-01-05
Payer: OTHER GOVERNMENT

## 2025-01-02 PROCEDURE — 97110 THERAPEUTIC EXERCISES: CPT

## 2025-01-02 PROCEDURE — 97112 NEUROMUSCULAR REEDUCATION: CPT

## 2025-01-02 PROCEDURE — 97535 SELF CARE MNGMENT TRAINING: CPT

## 2025-01-02 PROCEDURE — 97530 THERAPEUTIC ACTIVITIES: CPT

## 2025-01-02 NOTE — PROGRESS NOTES
PHYSICAL / OCCUPATIONAL THERAPY - DAILY TREATMENT NOTE     Patient Name: Elvie Yarbrough    Date: 2025    : 1995  Insurance: Payor:  EAST / Plan: [employer] EAST PRIME / Product Type: *No Product type* /      Patient  verified Yes     Visit #   Current / Total 3 15   Time   In / Out 12:34 1:10   Pain   In / Out 3 4   Subjective Functional Status/Changes: Patient notes increased lbp when getting out of bed and performing bent over row at gym   Changes to:  Allergies, Med Hx, Sx Hx?   no       TREATMENT AREA =  Low back pain [M54.50]    If an interpreting service is utilized for treatment of this patient, the contents of this document represent the material reviewed with the patient via the .     OBJECTIVE    Therapeutic Procedures:  Tx Min Billable or 1:1 Min (if diff from Tx Min) Procedure, Rationale, Specifics   8  91391 Therapeutic Exercise (timed):  increase ROM, strength, coordination, balance, and proprioception to improve patient's ability to progress to PLOF and address remaining functional goals. (see flow sheet as applicable)    Details if applicable:     10  06657 Neuromuscular Re-Education (timed):  improve balance, coordination, kinesthetic sense, posture, core stability and proprioception to improve patient's ability to develop conscious control of individual muscles and awareness of position of extremities in order to progress to PLOF and address remaining functional goals. (see flow sheet as applicable)    Details if applicable:     10  86903 Therapeutic Activity (timed):  use of dynamic activities replicating functional movements to increase ROM, strength, coordination, balance, and proprioception in order to improve patient's ability to progress to PLOF and address remaining functional goals.  (see flow sheet as applicable)     Details if applicable:     8  55379 Self Care/Home Management (timed):  improve patient knowledge and understanding of home injury/symptom/pain

## 2025-01-07 ENCOUNTER — HOSPITAL ENCOUNTER (OUTPATIENT)
Facility: HOSPITAL | Age: 30
Setting detail: RECURRING SERIES
Discharge: HOME | End: 2025-01-10
Payer: OTHER GOVERNMENT

## 2025-01-07 PROCEDURE — 97530 THERAPEUTIC ACTIVITIES: CPT

## 2025-01-07 PROCEDURE — 97535 SELF CARE MNGMENT TRAINING: CPT

## 2025-01-07 PROCEDURE — 97110 THERAPEUTIC EXERCISES: CPT

## 2025-01-07 NOTE — PROGRESS NOTES
4+/5, left hip extension 4+/5     Patient will be able to carry at least 40 pounds total (distributed between both arms) during [employer] with good posture in order to simulate patient being able to carry grocery bags to/from their car <> home.              Eval: to be assessed when appropriate    Current: Pt able to perform 45 lbs box lift with good form, minimal cueing and no increased pain 1/7/24    Patient will be able to ambulate at least 1 mile with 0-1/10 LBP to have a better QOL with community ambulation.              Eval: up to 8/10 pain when ambulating 1 mile       PLAN  Yes  Continue plan of care  []  Upgrade activities as tolerated  []  Discharge due to :  []  Other:    Trevon Howe PTA    1/7/2025    7:54 AM    Future Appointments   Date Time Provider Department Center   1/7/2025  9:50 AM Shyam Trevon DU, PTA [facility]   1/9/2025  1:10 PM Shyam Trevon DU, PTA [facility]   1/14/2025 11:10 AM Shyam, Trevon DU, PTA [facility]   1/16/2025 11:10 AM Patience Gillespie, PT [facility]   1/21/2025  8:30 AM Shyam, Trevon DU, PTA [facility]   1/23/2025  9:10 AM Shyam, Trevon DU, PTA [facility]   1/28/2025  8:30 AM Shyam, Trevon DU, PTA [facility]   1/30/2025  8:30 AM Paola Nichole, PT [facility]   2/4/2025  8:30 AM Shyam, Trevon DU, PTA [facility]   2/6/2025  8:30 AM Shyam, Trevon DU, PTA [facility]   2/11/2025  8:30 AM ShyamTrevon velarde H, PTA [facility]   2/13/2025  8:30 AM Shyam, Trevon H, PTA [facility]   2/18/2025  9:10 AM Patience Gillespie, PT [facility]

## 2025-01-09 ENCOUNTER — TELEPHONE (OUTPATIENT)
Facility: HOSPITAL | Age: 30
End: 2025-01-09

## 2025-01-09 ENCOUNTER — HOSPITAL ENCOUNTER (OUTPATIENT)
Facility: HOSPITAL | Age: 30
Setting detail: RECURRING SERIES
End: 2025-01-09
Payer: OTHER GOVERNMENT

## 2025-01-14 ENCOUNTER — TELEPHONE (OUTPATIENT)
Facility: HOSPITAL | Age: 30
End: 2025-01-14

## 2025-01-14 ENCOUNTER — HOSPITAL ENCOUNTER (OUTPATIENT)
Facility: HOSPITAL | Age: 30
Setting detail: RECURRING SERIES
End: 2025-01-14
Payer: OTHER GOVERNMENT

## 2025-01-16 ENCOUNTER — TELEPHONE (OUTPATIENT)
Facility: HOSPITAL | Age: 30
End: 2025-01-16

## 2025-01-16 ENCOUNTER — HOSPITAL ENCOUNTER (OUTPATIENT)
Facility: HOSPITAL | Age: 30
Setting detail: RECURRING SERIES
End: 2025-01-16
Payer: OTHER GOVERNMENT

## 2025-01-21 ENCOUNTER — HOSPITAL ENCOUNTER (OUTPATIENT)
Facility: HOSPITAL | Age: 30
Setting detail: RECURRING SERIES
Discharge: HOME | End: 2025-01-24
Payer: OTHER GOVERNMENT

## 2025-01-21 PROCEDURE — 97530 THERAPEUTIC ACTIVITIES: CPT

## 2025-01-21 PROCEDURE — 97535 SELF CARE MNGMENT TRAINING: CPT

## 2025-01-21 PROCEDURE — 97110 THERAPEUTIC EXERCISES: CPT

## 2025-01-21 NOTE — PROGRESS NOTES
[facility] at [facility]  [address] [place] [address]  Ph [phone]  Fx [phone]    Physical Therapy Progress Note  Patient name: Elvie Yarbrough Start of Care: 2024   Referral source: Fabiano Reyez* : 1995               Medical Diagnosis: Low back pain [M54.50]    Onset Date:24               Treatment Diagnosis: M54.59  OTHER LOWER BACK PAIN     Prior Hospitalization: see medical history Provider#: [id number]      Comorbidities: chronic LBP, chronic right knee pain, OA  Prior Level of Function:   [x] Unrestricted with functional activities and ADL's  [x] No assistive device  [x] active lifestyle, [] moderately active lifestyle, [] sedentary lifestyle  Reporting Period: 24-25    Visits from Start of Care: 5    Missed Visits: 3    Updated Goals/Measure of Progress:   Short Term Goals:    to be accomplished within 8 treatments:     Patient will be compliant and independent with prescribed HEP(s) in order to assist in maximizing therapeutic gains and improving overall function.  Eval: HEP to be issued and reviewed with patient within 1-2 visits              PN 2025: Pt reports maintaining daily exercises whether at the gym or performing HEP at home  MET     Patient will report at least a 25% reduction in pain/symptoms while performing functional activities in order to improve overall tolerance to functional movements and progress towards PLOF.  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              Eval: up to 8/10 pain when sitting > 30 minutes              PN 2025: pt able to sit for 60 minutes but, reports pain up to a 5/10   PROGRESSING        Long Term Goals:     to be accomplished within 15 treatments:     Patient 
:  []  Other:    Trevon DU Shyam, MT    1/21/2025    7:47 AM    Future Appointments   Date Time Provider Department Center   1/21/2025  8:30 AM Trevon Howe, PTA [facility]   1/23/2025  9:10 AM Trevon Howe, PTA [facility]   1/28/2025  8:30 AM ShyamTrevon, PTA [facility]   1/30/2025  8:30 AM Paola Nichole, PT [facility]   2/4/2025  8:30 AM ShyamTrevon velarde, PTA [facility]   2/6/2025  8:30 AM Shyam Trevon DU, PTA [facility]   2/11/2025  8:30 AM Shyam Trevon DU, PTA [facility]   2/13/2025  8:30 AM Shyam Trevon DU, PTA [facility]   2/18/2025  9:10 AM Patience Gillespie, PT [facility]

## 2025-01-23 ENCOUNTER — HOSPITAL ENCOUNTER (OUTPATIENT)
Facility: HOSPITAL | Age: 30
Setting detail: RECURRING SERIES
Discharge: HOME | End: 2025-01-26
Payer: OTHER GOVERNMENT

## 2025-01-23 PROCEDURE — 97535 SELF CARE MNGMENT TRAINING: CPT

## 2025-01-23 PROCEDURE — 97530 THERAPEUTIC ACTIVITIES: CPT

## 2025-01-23 PROCEDURE — 97110 THERAPEUTIC EXERCISES: CPT

## 2025-01-27 NOTE — PROGRESS NOTES
PHYSICAL / OCCUPATIONAL THERAPY - DAILY TREATMENT NOTE     Patient Name: Elvie Yarbrough    Date: 2025    : 1995  Insurance: Payor:  EAST / Plan:  [employer]IME / Product Type: *No Product type* /      Patient  verified Yes     Visit #   Current / Total 6 15   Time   In / Out 9:15 9:53   Pain   In / Out 1/10 1/10   Subjective Functional Status/Changes: \"I don't have too much pain in my back at all.\"   Changes to:  Allergies, Med Hx, Sx Hx?   no       TREATMENT AREA =  Low back pain [M54.50]    If an interpreting service is utilized for treatment of this patient, the contents of this document represent the material reviewed with the patient via the .     OBJECTIVE    Therapeutic Procedures:  Tx Min Billable or 1:1 Min (if diff from Tx Min) Procedure, Rationale, Specifics   16  [id number] Therapeutic Exercise (timed):  increase ROM, strength, coordination, balance, and proprioception to improve patient's ability to progress to PLOF and address remaining functional goals. (see flow sheet as applicable)    Details if applicable:         51856 Neuromuscular Re-Education (timed):  improve balance, coordination, kinesthetic sense, posture, core stability and proprioception to improve patient's ability to develop conscious control of individual muscles and awareness of position of extremities in order to progress to PLOF and address remaining functional goals. (see flow sheet as applicable)    Details if applicable:     12  17841 Therapeutic Activity (timed):  use of dynamic activities replicating functional movements to increase ROM, strength, coordination, balance, and proprioception in order to improve patient's ability to progress to PLOF and address remaining functional goals.  (see flow sheet as applicable)     Details if applicable:     10  97911 Self Care/Home Management (timed):  improve patient knowledge and understanding of home injury/symptom/pain management, positioning, and 
Physical Therapy Discharge Instructions    [facility]  [address] [place] [address]  Ph [phone]  Fx [phone]      Patient: Elvie Yarbrough  : 1995      Continue Home Exercise Program 3 times per day for 3 weeks, then decrease to 3 times per week      Continue with    [x] Ice  as needed      [x] Heat           Follow up with MD:     [] Upon completion of therapy     [x] As needed      Recommendations:     [x]   Return to activity with home program    []   Return to activity with the following modifications:       []Post Rehab Program    []Join Independent aquatic program     []Return to/join local gym        Additional Comments:           Trevon Howe, PTA 2025 10:27 AM  
compliant and independent with prescribed HEP(s) in order to assist in maximizing therapeutic gains and improving overall function.  Eval: HEP to be issued and reviewed with patient within 1-2 visits              PN 1/21/2025: Pt reports maintaining daily exercises whether at the gym or performing HEP at home  MET     Patient will report at least a 25% reduction in pain/symptoms while performing functional activities in order to improve overall tolerance to functional movements and progress towards PLOF.  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              Eval: up to 8/10 pain when sitting > 30 minutes              PN 1/21/2025: pt able to sit for 60 minutes but, reports pain up to a 5/10   PROGRESSING              Current: Pt reports mild discomfort of 2-3/10 continued when sitting for extended time but, able to adjust position to alleviate   1/23/25, NEARLY MET     Long Term Goals:     to be accomplished within 15 treatments:     Patient will score 9% or lower on Oswestry Disability Index to meet MCID and show improvement with functional activities and ADL's.              Scoring:           MCID = 11%                                        41-60% = severe disability                                      0-20% = minimal disability                  61-80% = crippled  21-40% = moderate disability             % = bedbound/exaggeration              Eval: 20% on Oswestry               PN 1/21/2025: Oswestry: 12%   NEARLY MET     Patient will report an average daily pain of 1/10 or less during all functional activities in order to improve QOL and return to patient's PLOF.  Eval: 8/10 pain at worst, but an average daily pain of 3/10  PN 1/21/2025: Average daily pain at 1-2/10   NEARLY

## 2025-01-28 ENCOUNTER — APPOINTMENT (OUTPATIENT)
Facility: HOSPITAL | Age: 30
End: 2025-01-28
Payer: OTHER GOVERNMENT

## 2025-01-30 ENCOUNTER — APPOINTMENT (OUTPATIENT)
Facility: HOSPITAL | Age: 30
End: 2025-01-30
Payer: OTHER GOVERNMENT